# Patient Record
(demographics unavailable — no encounter records)

---

## 2024-11-06 NOTE — DISCUSSION/SUMMARY
[TextEntry] : LATOYA AVERY is a 33 year old M who presents for initial consultation for reconstructive options after planned excision of pilonidal cyst.   At this time, recommended reconstruction of the pilonidal area following excision as there will be a full thickness defect of the involved area.   It was discussed with the patient that the reconstructive options will be based on the defect size and surrounding tissue laxity of the involved area. Patient was counseled that the area involved is a high tension area that may affect wound healing. We discussed the surgical reconstruction which involves a local tissue rearrangement, such as the Limberg or the Dufourmentel flap. We also discussed the necessity of placement of a drain. The patient was explained the potential complications and risks of the surgery in full detail. Risks following layered primary closure or local tissue rearrangement includes but are not limited to wound dehiscence, delayed wound healing, contour irregularities, bleeding, bruising, infection, fat necrosis, recurrence, numbness or paraesthesias. All questions were answered; the patient fully understands the risks and plan and would like to proceed as discussed today.   - Plan for reconstruction of pilonidal wound, possible local flap - Our office will coordinate with Dr. Morataya - Surgical paperwork submitted

## 2024-11-06 NOTE — CONSULT LETTER
[Dear  ___] : Dear  [unfilled], [Consult Letter:] : I had the pleasure of evaluating your patient, [unfilled]. [Please see my note below.] : Please see my note below. [Consult Closing:] : Thank you very much for allowing me to participate in the care of this patient.  If you have any questions, please do not hesitate to contact me. [Sincerely,] : Sincerely, [FreeTextEntry3] : Wilian Pichardo MD

## 2024-11-06 NOTE — HISTORY OF PRESENT ILLNESS
[FreeTextEntry1] : LATOYA AVERY is a 33 year old M who presents for initial consultation for reconstructive options after planned excision of pilonidal cyst.

## 2024-11-06 NOTE — PHYSICAL EXAM
[TextEntry] : Sacrococcygeal: hirsute, no erythema,  palpable mass with palpable sinus tract, - fluctuance, no abscess, + pits along the midline, non-tender, no drainage noted.  Constitutional: NAD, well-nourished HENT: Normocephalic, atraumatic, PERRL, non-icteric sclerae, neck supple, trachea midline PULM: LSCTAB, no wheezing or rhonchi CV: RRR, no murmur, rubs, or gallops Abd: Soft, NT, ND, +BS, no palpable masses or bulge MSK: SIDDIQI, 5/5 strength to all extremities Skin: No rash noted Neuro: A&O x 3, no FND Psych: Mood is appropriate

## 2025-01-21 NOTE — HISTORY OF PRESENT ILLNESS
[FreeTextEntry1] : LATOYA AVERY is a 33 year old M who is s/p reconstruction of pilonidal wound, possible local flap. DOS 1/14/25.   Patient is POd#7. This is his first post-op visit.  Drain log reviewed, plan to remove ADEEL drain. Patient offers no acute complaints, denies cp, sob, subjective fever, chills, and sweats.

## 2025-01-21 NOTE — PHYSICAL EXAM
[TextEntry] : Sacrum: initial dressing removed, incision c/d/i, small open area to superior aspect of incision suspect suture fell out, bacitracin ointment applied, flap is viable, no fluctuance No gross signs of infection nylon sutures patent & intact ADEEL drain removed, pressure dressing applied

## 2025-01-21 NOTE — ASSESSMENT
[FreeTextEntry1] : LATOYA AVERY is a 33 year old M who is s/p reconstruction of pilonidal wound, possible local flap. DOS 1/14/25.   ADEEL drain removed, pressure dressing applied.  - OK to remove pressure dressing after 3 days - OK to get incisions wet, let water run down, pat dry, do not scrub - If sutures get caught on clothing, OK to apply gauze - Activity restrictions reviewed, absolutely no sitting or squatting - F/u 2 weeks for suture removal

## 2025-02-07 NOTE — PHYSICAL EXAM
[TextEntry] : Sacrum: flap is viable, incision healing well, no gaps or open areas, previous small dehiscence along superior aspect of incision healing well, no fluctuance No gross signs of infection All sutures removed, tolerated well. Brown tape applied.

## 2025-02-07 NOTE — HISTORY OF PRESENT ILLNESS
[FreeTextEntry1] : LATOYA AVERY is a 33 year old M who is s/p reconstruction of pilonidal wound, possible local flap. DOS 1/14/25.   Patient presents today for suture removal, offers no acute complaints denies cp, sob, subjective fever, chills, and sweats.

## 2025-02-07 NOTE — ADDENDUM
[FreeTextEntry1] :  This note was authored by Jazmin Perez working as a scribe for Dr.Victor Pichardo. I, Dr. Wilian Pichardo have reviewed the content of this note and confirm it is true and accurate. I personally performed the history and physical examination and made all the decisions 02/06/2025

## 2025-02-07 NOTE — ASSESSMENT
[FreeTextEntry1] : LATOYA AVERY is a 33 year old M who is s/p reconstruction of pilonidal wound, possible local flap. DOS 1/14/25.   All sutures removed, tolerated well. Brown tape applied.   - OK to shower - Activity restrictions reviewed - F/u 3-4 weeks to re-evaluate healing

## 2025-03-03 NOTE — ADDENDUM
[FreeTextEntry1] :  This note was authored by Jazmin Perez working as a scribe for Dr.Victor Pichardo. I, Dr. Wilian Pichardo have reviewed the content of this note and confirm it is true and accurate. I personally performed the history and physical examination and made all the decisions 02/27/2025

## 2025-03-03 NOTE — ASSESSMENT
[FreeTextEntry1] : LATOYA AVERY is a 33 year old M who is s/p reconstruction of pilonidal wound, possible local flap. DOS 1/14/25.   Exam findings discussed with patient. Emphasized importance of keeping the incision clean and dry  -silvadene script provided  -OK to shower, pat dry, apply silvadene and gauze daily -Activity restrictions reviewed  - F/u 2 weeks

## 2025-03-03 NOTE — HISTORY OF PRESENT ILLNESS
[FreeTextEntry1] : LATOYA AVERY is a 33 year old M who is s/p reconstruction of pilonidal wound, possible local flap. DOS 1/14/25.   Patient states that his girlfriend looked at the incision a week ago and thinks it got worse Denies cp, sob, subjective fever, chills, and sweats.

## 2025-03-03 NOTE — PHYSICAL EXAM
[TextEntry] : Sacrum: flap is viable, incision healing well, small no fluctuance, previous small dehiscence along superior aspect of incision has opened some, silvadene + gauze applied No gross signs of infection

## 2025-04-10 NOTE — HISTORY OF PRESENT ILLNESS
[FreeTextEntry1] : LATOYA AVERY is a 33 year old M who is s/p reconstruction of pilonidal wound, possible local flap. DOS 1/14/25. He presents reporting doing well, in good spirits, and without any complaints. He denies fever, chills, redness, swelling, pain.

## 2025-04-10 NOTE — PHYSICAL EXAM
[TextEntry] : Sacrum: flap well healed, soft, nt., no collection or evidence of infection. no hypertrophic scarring.

## 2025-04-10 NOTE — REVIEW OF SYSTEMS
[TextEntry] : A detailed set of ROS were asked and negative except those outlined in HPI. [Negative] : Heme/Lymph

## 2025-04-10 NOTE — ADDENDUM
[FreeTextEntry1] :  This note was authored by Jazmin Perez working as a scribe for Dr.Victor Pichardo. I, Dr. Wilian Pichardo have reviewed the content of this note and confirm it is true and accurate. I personally performed the history and physical examination and made all the decisions 04/03/2025